# Patient Record
Sex: MALE | Race: WHITE | Employment: FULL TIME | URBAN - METROPOLITAN AREA
[De-identification: names, ages, dates, MRNs, and addresses within clinical notes are randomized per-mention and may not be internally consistent; named-entity substitution may affect disease eponyms.]

---

## 2020-09-02 ENCOUNTER — HOSPITAL ENCOUNTER (EMERGENCY)
Age: 25
Discharge: HOME | End: 2020-09-02
Attending: STUDENT IN AN ORGANIZED HEALTH CARE EDUCATION/TRAINING PROGRAM

## 2020-09-02 VITALS
HEIGHT: 68 IN | WEIGHT: 165 LBS | RESPIRATION RATE: 15 BRPM | BODY MASS INDEX: 25.01 KG/M2 | OXYGEN SATURATION: 97 % | TEMPERATURE: 97.6 F | SYSTOLIC BLOOD PRESSURE: 125 MMHG | DIASTOLIC BLOOD PRESSURE: 78 MMHG | HEART RATE: 88 BPM

## 2020-09-02 DIAGNOSIS — S39.012A BACK STRAIN, INITIAL ENCOUNTER: Primary | ICD-10-CM

## 2020-09-02 PROCEDURE — 74011250637 HC RX REV CODE- 250/637: Performed by: STUDENT IN AN ORGANIZED HEALTH CARE EDUCATION/TRAINING PROGRAM

## 2020-09-02 PROCEDURE — 99283 EMERGENCY DEPT VISIT LOW MDM: CPT

## 2020-09-02 RX ORDER — KETOROLAC TROMETHAMINE 10 MG/1
10 TABLET, FILM COATED ORAL
Status: COMPLETED | OUTPATIENT
Start: 2020-09-02 | End: 2020-09-02

## 2020-09-02 RX ORDER — IBUPROFEN 800 MG/1
800 TABLET ORAL
Qty: 20 TAB | Refills: 0 | Status: SHIPPED | OUTPATIENT
Start: 2020-09-02 | End: 2020-09-09

## 2020-09-02 RX ORDER — DEXAMETHASONE SODIUM PHOSPHATE 100 MG/10ML
10 INJECTION INTRAMUSCULAR; INTRAVENOUS
Status: COMPLETED | OUTPATIENT
Start: 2020-09-02 | End: 2020-09-02

## 2020-09-02 RX ORDER — DEXAMETHASONE 2 MG/1
TABLET ORAL
Qty: 4 TAB | Refills: 0 | Status: SHIPPED | OUTPATIENT
Start: 2020-09-02

## 2020-09-02 RX ORDER — CYCLOBENZAPRINE HCL 5 MG
10 TABLET ORAL
Qty: 20 TAB | Refills: 0 | OUTPATIENT
Start: 2020-09-02 | End: 2020-09-06

## 2020-09-02 RX ADMIN — KETOROLAC TROMETHAMINE 10 MG: 10 TABLET, FILM COATED ORAL at 14:49

## 2020-09-02 RX ADMIN — DEXAMETHASONE SODIUM PHOSPHATE 10 MG: 10 INJECTION INTRAMUSCULAR; INTRAVENOUS at 14:49

## 2020-09-02 NOTE — DISCHARGE INSTRUCTIONS
Patient Education   Take the medications prescribed as directed. Continue the hot compress and ice to the area as discussed. Perform the exercises listed. Return for worsening symptoms, concerns or questions. Back Strain: Care Instructions  Overview     A back strain happens when you overstretch, or pull, a muscle in your back. You may hurt your back in an accident or when you exercise or lift something. Sometimes you may not know how you hurt your back. Most back pain will get better with rest and time. You can take care of yourself at home to help your back heal.  Follow-up care is a key part of your treatment and safety. Be sure to make and go to all appointments, and call your doctor if you are having problems. It's also a good idea to know your test results and keep a list of the medicines you take. How can you care for yourself at home? · Try to stay as active as you can, but stop or reduce any activity that causes pain. · Put ice or a cold pack on the sore muscle for 10 to 20 minutes at a time to stop swelling. Try this every 1 to 2 hours for 3 days (when you are awake) or until the swelling goes down. Put a thin cloth between the ice pack and your skin. · After 2 or 3 days, apply a heating pad on low or a warm cloth to your back. Some doctors suggest that you go back and forth between hot and cold treatments. · Take pain medicines exactly as directed. ? If the doctor gave you a prescription medicine for pain, take it as prescribed. ? If you are not taking a prescription pain medicine, ask your doctor if you can take an over-the-counter medicine. · Try sleeping on your side with a pillow between your legs. Or put a pillow under your knees when you lie on your back. These measures can ease pain in your lower back. · Return to your usual level of activity slowly. When should you call for help? [id number] anytime you think you may need emergency care.  For example, call if:  · You are unable to move a leg at all. Call your doctor now or seek immediate medical care if:  · You have new or worse symptoms in your legs, belly, or buttocks. Symptoms may include:  ? Numbness or tingling. ? Weakness. ? Pain. · You lose bladder or bowel control. Watch closely for changes in your health, and be sure to contact your doctor if:  · You have a fever, lose weight, or don't feel well. · You are not getting better as expected. Where can you learn more? Go to http://paco-peyton.info/  Enter C806 in the search box to learn more about \"Back Strain: Care Instructions. \"  Current as of: March 2, 2020               Content Version: 12.5  © 4632-0176 [employer]. Care instructions adapted under license by [employer] (which disclaims liability or warranty for this information). If you have questions about a medical condition or this instruction, always ask your healthcare professional. [place] [address] any warranty or liability for your use of this information.

## 2020-09-02 NOTE — ED TRIAGE NOTES
Patient ambulatory to triage without difficulty with mask in place. Patient reports lower back pain for 2 weeks. Pain is worse with movement. Denies injury.

## 2020-09-02 NOTE — ED PROVIDER NOTES
24-year-old male patient presents with reports of lower back pain. Symptoms have been present progressive over the past several days. Denies any falls, injury or trauma. Denies associated fever, chills, numbness tingling or weakness. Reports normal bowel bladder habits at home and denies any loss of control of either bladder or bladder. History of some back pain in the past that is resolved on its own. Patient also has noted some left-sided neck discomfort that started yesterday. Denies any injury to the neck. Denies stiffness or fever associated with this symptom. There is no discomfort or numbness/weakness over the upper extremities either. States the symptoms have progressed over several days and failed to improve prompting his visit to this department. 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 Patient reports history of hypertension and ADHD in the past.           Past Medical History:   Diagnosis Date    Hypertension     Psychiatric disorder     ADHD       Past Surgical History:   Procedure Laterality Date    HX ORTHOPAEDIC      right ankle repair from fracture         No family history on file.     Social History     Socioeconomic History    Marital status: SINGLE     Spouse name: Not on file    Number of children: Not on file    Years of education: Not on file    Highest education level: Not on file   Occupational History    Not on file   Social Needs    Financial resource strain: Not on file    Food insecurity     Worry: Not on file     Inability: Not on file    Transportation needs     Medical: Not on file     Non-medical: Not on file   Tobacco Use    Smoking status: Current Every Day Smoker     Packs/day: 0.25   Substance and Sexual Activity    Alcohol use: No    Drug use: Not on file    Sexual activity: Not on file   Lifestyle    Physical activity     Days per week: Not on file     Minutes per session: Not on file    Stress: Not on file   Relationships    Social connections     Talks on phone: Not on file     Gets together: Not on file     Attends Jainism service: Not on file     Active member of club or organization: Not on file     Attends meetings of clubs or organizations: Not on file     Relationship status: Not on file    Intimate partner violence     Fear of current or ex partner: Not on file     Emotionally abused: Not on file     Physically abused: Not on file     Forced sexual activity: Not on file   Other Topics Concern    Not on file   Social History Narrative    Not on file         ALLERGIES: Patient has no known allergies. Review of Systems   Constitutional: Negative for chills, diaphoresis and fever. HENT: Negative for congestion, sneezing and sore throat. Eyes: Negative for visual disturbance. Respiratory: Negative for cough, chest tightness, shortness of breath and wheezing. Cardiovascular: Negative for chest pain and leg swelling. Gastrointestinal: Negative for abdominal pain, blood in stool, diarrhea, nausea and vomiting. Endocrine: Negative for polyuria. Genitourinary: Negative for difficulty urinating, dysuria, flank pain, hematuria and urgency. Musculoskeletal: Positive for back pain. Negative for myalgias, neck pain and neck stiffness. Skin: Negative for color change and rash. Neurological: Negative for dizziness, syncope, speech difficulty, weakness, light-headedness, numbness and headaches. Psychiatric/Behavioral: Negative for behavioral problems. All other systems reviewed and are negative. Vitals:    09/02/20 1339   BP: 131/84   Pulse: 80   Resp: 16   Temp: 98.1 °F (36.7 °C)   SpO2: 98%   Weight: 74.8 kg (165 lb)   Height: 5' 8\" (1.727 m)            Physical Exam  Vitals signs and nursing note reviewed. Constitutional:       General: He is not in acute distress. Appearance: He is well-developed. He is not diaphoretic. Comments: Alert and oriented to person place and time. No acute distress, speaks in clear, fluid sentences. HENT:      Head: Normocephalic and atraumatic. Right Ear: External ear normal.      Left Ear: External ear normal.      Nose: Nose normal.   Eyes:      Pupils: Pupils are equal, round, and reactive to light. Neck:      Musculoskeletal: Normal range of motion. Muscular tenderness present. No pain with movement or spinous process tenderness. Comments: No pain with palpation of the midline cervical spine. Or some discomfort over the left musculature of the cervical spine. Appears to have normal range of motion on exam.  Cardiovascular:      Rate and Rhythm: Normal rate. Pulses: Normal pulses. Pulmonary:      Effort: Pulmonary effort is normal. No respiratory distress. Breath sounds: No decreased breath sounds. Abdominal:      General: There is no distension. Palpations: Abdomen is soft. There is no mass. Tenderness: There is no abdominal tenderness. There is no guarding or rebound. Hernia: No hernia is present. Comments: Unremarkable abdominal exam   Musculoskeletal: Normal range of motion. General: No tenderness or deformity. Comments: There is reported pain with palpation of the right lower lumbar musculature though no pain specifically over the midline thoracic or lumbar spine. No palpable step-off or deformity noted. No skin changes noted on exam.   Skin:     General: Skin is warm and dry. Neurological:      Mental Status: He is alert and oriented to person, place, and time. Cranial Nerves: No cranial nerve deficit. Deep Tendon Reflexes: Reflexes are normal and symmetric. Comments: Weakly positive straight leg raise most pronounced over the right side.           MDM  Number of Diagnoses or Management Options  Back strain, initial encounter: new and does not require workup  Diagnosis management comments: Patient denies any falls or trauma. No evidence of trauma on exam nor does he is exhibit any findings gated with radicular type symptoms. Pain is isolated to the right lower lumbar musculature and does not involve the midline spine. I do not feel patient requires imaging or labs at this time. We will plan to treat symptomatically and provide referral to outpatient primary care. Voice dictation software was used during the making of this note. This software is not perfect and grammatical and other typographical errors may be present. This note has been proofread, but may still contain errors.   [address] [facility], ; 9/2/2020 @2:39 PM   ===================================================================         Amount and/or Complexity of Data Reviewed  Tests in the medicine section of CPT®: ordered and reviewed    Risk of Complications, Morbidity, and/or Mortality  Presenting problems: low  Diagnostic procedures: low  Management options: low    Patient Progress  Patient progress: stable         Procedures

## 2020-09-02 NOTE — ED NOTES
I have reviewed discharge instructions with the patient. The patient verbalized understanding. Patient left ED via Discharge Method: ambulatory to Home with self    Opportunity for questions and clarification provided. Patient given 3 scripts. To continue your aftercare when you leave the hospital, you may receive an automated call from our care team to check in on how you are doing. This is a free service and part of our promise to provide the best care and service to meet your aftercare needs.  If you have questions, or wish to unsubscribe from this service please call [phone]. Thank you for Choosing our [facility] Emergency Department.

## 2020-09-02 NOTE — LETTER
[facility] EMERGENCY DEPT 
[address]  [address] [facility] 
[phone] Work/School Note Date: 9/2/2020 To Whom It May concern: 
 
Farhad Worthington was seen and treated today in the emergency room by the following provider(s): 
Attending Provider: Shani Drummond. Farhad Worthington may return to work on 09/03/2020.  
 
Sincerely, 
 
 
 
 
Edis Ag RN

## 2020-09-06 ENCOUNTER — APPOINTMENT (OUTPATIENT)
Dept: GENERAL RADIOLOGY | Age: 25
End: 2020-09-06
Attending: EMERGENCY MEDICINE

## 2020-09-06 ENCOUNTER — HOSPITAL ENCOUNTER (EMERGENCY)
Age: 25
Discharge: HOME | End: 2020-09-06
Attending: EMERGENCY MEDICINE

## 2020-09-06 VITALS
DIASTOLIC BLOOD PRESSURE: 89 MMHG | SYSTOLIC BLOOD PRESSURE: 132 MMHG | RESPIRATION RATE: 18 BRPM | BODY MASS INDEX: 25.76 KG/M2 | HEIGHT: 68 IN | HEART RATE: 99 BPM | OXYGEN SATURATION: 100 % | WEIGHT: 170 LBS | TEMPERATURE: 98.1 F

## 2020-09-06 DIAGNOSIS — M54.50 ACUTE LOW BACK PAIN WITHOUT SCIATICA, UNSPECIFIED BACK PAIN LATERALITY: Primary | ICD-10-CM

## 2020-09-06 PROCEDURE — 74011250636 HC RX REV CODE- 250/636: Performed by: EMERGENCY MEDICINE

## 2020-09-06 PROCEDURE — 96372 THER/PROPH/DIAG INJ SC/IM: CPT

## 2020-09-06 PROCEDURE — 99283 EMERGENCY DEPT VISIT LOW MDM: CPT

## 2020-09-06 PROCEDURE — 72100 X-RAY EXAM L-S SPINE 2/3 VWS: CPT

## 2020-09-06 RX ORDER — METHOCARBAMOL 500 MG/1
500 TABLET, FILM COATED ORAL
Qty: 20 TAB | Refills: 0 | Status: SHIPPED | OUTPATIENT
Start: 2020-09-06

## 2020-09-06 RX ORDER — KETOROLAC TROMETHAMINE 30 MG/ML
30 INJECTION, SOLUTION INTRAMUSCULAR; INTRAVENOUS
Status: COMPLETED | OUTPATIENT
Start: 2020-09-06 | End: 2020-09-06

## 2020-09-06 RX ADMIN — KETOROLAC TROMETHAMINE 30 MG: 30 INJECTION, SOLUTION INTRAMUSCULAR; INTRAVENOUS at 18:38

## 2020-09-06 NOTE — ED PROVIDER NOTES
HPI   28-year-old male presents to the emergency department with complaint of continued low back pain. Ongoing for a couple weeks. He was seen a few days ago for the same. No recent trauma. Treated symptomatically with Decadron ibuprofen and cyclobenzaprine. Symptoms have persisted. Denies loss of control of bowel or bladder function. Denies focal weakness or paresthesias. Denies abdominal pain. Denies flank pain or hematuria. No known history of nephrolithiasis. Symptoms worse with movement. No chest pain or dyspnea. Past Medical History:   Diagnosis Date    Hypertension     Psychiatric disorder     ADHD       Past Surgical History:   Procedure Laterality Date    HX ORTHOPAEDIC      right ankle repair from fracture         History reviewed. No pertinent family history.     Social History     Socioeconomic History    Marital status: SINGLE     Spouse name: Not on file    Number of children: Not on file    Years of education: Not on file    Highest education level: Not on file   Occupational History    Not on file   Social Needs    Financial resource strain: Not on file    Food insecurity     Worry: Not on file     Inability: Not on file    Transportation needs     Medical: Not on file     Non-medical: Not on file   Tobacco Use    Smoking status: Current Every Day Smoker     Packs/day: 0.25   Substance and Sexual Activity    Alcohol use: No    Drug use: Not on file    Sexual activity: Not on file   Lifestyle    Physical activity     Days per week: Not on file     Minutes per session: Not on file    Stress: Not on file   Relationships    Social connections     Talks on phone: Not on file     Gets together: Not on file     Attends Cheondoism service: Not on file     Active member of club or organization: Not on file     Attends meetings of clubs or organizations: Not on file     Relationship status: Not on file    Intimate partner violence     Fear of current or ex partner: Not on file Emotionally abused: Not on file     Physically abused: Not on file     Forced sexual activity: Not on file   Other Topics Concern    Not on file   Social History Narrative    Not on file         ALLERGIES: Ceclor [cefaclor]    Review of Systems  Review of Systems Negative Except as in HPI    Vitals:    09/06/20 1740   BP: (!) 143/98   Pulse: 98   Resp: 16   Temp: 98.1 °F (36.7 °C)   SpO2: 97%   Weight: 77.1 kg (170 lb)   Height: 5' 8\" (1.727 m)            Physical Exam  Vitals signs and nursing note reviewed. Constitutional:       General: He is not in acute distress. Appearance: He is not ill-appearing, toxic-appearing or diaphoretic. HENT:      Mouth/Throat:      Mouth: Mucous membranes are moist.   Eyes:      General: No scleral icterus. Neck:      Musculoskeletal: No muscular tenderness. Cardiovascular:      Rate and Rhythm: Normal rate and regular rhythm. Pulmonary:      Effort: Pulmonary effort is normal.      Breath sounds: Normal breath sounds. Abdominal:      Palpations: Abdomen is soft. Tenderness: There is no abdominal tenderness. Comments: No palpable transmitted aortic pulsation   Musculoskeletal:      Comments: Primarily right low back paraspinal tenderness. Minimal midline tenderness without focality or step-off. No midline or paraspinal neck or upper back tenderness. No obvious extremity abnormalities noted   Skin:     General: Skin is warm and dry. Comments: No zoster/vesicular lesions noted. Neurological:      Comments: Awake, alert. GCS 15. Cranial nerves II-XII grossly intact. No dysarthria or aphasia. Motor exam no gross/lateralizing motor deficits of the upper extremities. 5 out of 5 strength in bilateral hip flexion, knee flexion/extension, plantar/dorsiflexion. Normal bulk and tone of the muscles. Light touch sensation intact in the lower extremities. No saddle anesthesias. DTRs 2+ in bilateral patellar and Achilles tendons.    Psychiatric: Behavior: Behavior normal.          MDM       Procedures    Preliminary lumbar spine radiograph read by ED physician-no fracture or static subluxation noted. Currently, I believe the patient is low risk for infectious spondylodiscitis, cauda equina syndrome, or other acute compression of the spinal cord. There is no current indication for emergent MRI in emergency department today. .  There is no current evidence of intra-abdominal process as the etiology for the patient's back pain. I will provide short course of medications until the patient can follow-up with her primary care physician. Please note, this chart was dictated using Dragon dictation, voice recognition software. While efforts were made to correct any transcription errors, some may inadvertently remain. Please forgive punctuation and typographic/voice recognition errors. Please contact me with any questions concerns or for clarification of documentation.

## 2020-09-06 NOTE — DISCHARGE INSTRUCTIONS
Take medication as directed    Follow-up with your primary care physician within the next 1 to 2 weeks if symptoms do not improve as expected.     Return to the ER for weakness in your legs, temperature greater than 100.4, worsening pain or other symptoms that concern you

## 2020-09-06 NOTE — ED TRIAGE NOTES
Pt ambulatory to triage wearing bandana. Having right back pain and left neck pain. Given pain medication the other day for same c/o per pt. Denies trauma/injury.

## 2020-09-06 NOTE — ED NOTES
I have reviewed discharge instructions with the patient. The patient verbalized understanding. Patient left ED via Discharge Method: ambulatory to Home with self. Opportunity for questions and clarification provided. Patient given 1 scripts. To continue your aftercare when you leave the hospital, you may receive an automated call from our care team to check in on how you are doing. This is a free service and part of our promise to provide the best care and service to meet your aftercare needs.  If you have questions, or wish to unsubscribe from this service please call [phone]. Thank you for Choosing our [facility] Emergency Department.

## 2020-09-06 NOTE — Clinical Note
[address] [facility] EMERGENCY DEPT 
[address] [facility] 
[phone] Work/School Note Date: 9/6/2020 To Whom It May concern: 
 
Chad Phillips was seen and treated today in the emergency room by the following provider(s): 
Attending Provider: Yves Mata MD.   
 
Chad Phillips is excused from work/school on 09/06/20 and 09/07/20. He is medically clear to return to work/school on 9/8/2020. Sincerely, Lucretia Kumar MD